# Patient Record
Sex: MALE | Race: WHITE | NOT HISPANIC OR LATINO | Employment: STUDENT | URBAN - METROPOLITAN AREA
[De-identification: names, ages, dates, MRNs, and addresses within clinical notes are randomized per-mention and may not be internally consistent; named-entity substitution may affect disease eponyms.]

---
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
## 2019-06-01 ENCOUNTER — OFFICE VISIT (OUTPATIENT)
Dept: URGENT CARE | Facility: CLINIC | Age: 19
End: 2019-06-01
Payer: COMMERCIAL

## 2019-06-01 VITALS
OXYGEN SATURATION: 99 % | DIASTOLIC BLOOD PRESSURE: 65 MMHG | WEIGHT: 195 LBS | TEMPERATURE: 98 F | RESPIRATION RATE: 18 BRPM | HEART RATE: 75 BPM | SYSTOLIC BLOOD PRESSURE: 130 MMHG | HEIGHT: 72 IN | BODY MASS INDEX: 26.41 KG/M2

## 2019-06-01 DIAGNOSIS — R10.9 ABDOMINAL CRAMPING: ICD-10-CM

## 2019-06-01 DIAGNOSIS — R11.2 NON-INTRACTABLE VOMITING WITH NAUSEA, UNSPECIFIED VOMITING TYPE: Primary | ICD-10-CM

## 2019-06-01 DIAGNOSIS — R19.7 DIARRHEA, UNSPECIFIED TYPE: ICD-10-CM

## 2019-06-01 PROCEDURE — 99214 OFFICE O/P EST MOD 30 MIN: CPT | Mod: 25,S$GLB,, | Performed by: PHYSICIAN ASSISTANT

## 2019-06-01 PROCEDURE — 99214 PR OFFICE/OUTPT VISIT, EST, LEVL IV, 30-39 MIN: ICD-10-PCS | Mod: 25,S$GLB,, | Performed by: PHYSICIAN ASSISTANT

## 2019-06-01 PROCEDURE — 96372 PR INJECTION,THERAP/PROPH/DIAG2ST, IM OR SUBCUT: ICD-10-PCS | Mod: S$GLB,,, | Performed by: PHYSICIAN ASSISTANT

## 2019-06-01 PROCEDURE — 3008F PR BODY MASS INDEX (BMI) DOCUMENTED: ICD-10-PCS | Mod: CPTII,S$GLB,, | Performed by: PHYSICIAN ASSISTANT

## 2019-06-01 PROCEDURE — 96372 THER/PROPH/DIAG INJ SC/IM: CPT | Mod: S$GLB,,, | Performed by: PHYSICIAN ASSISTANT

## 2019-06-01 PROCEDURE — 3008F BODY MASS INDEX DOCD: CPT | Mod: CPTII,S$GLB,, | Performed by: PHYSICIAN ASSISTANT

## 2019-06-01 RX ORDER — PROMETHAZINE HYDROCHLORIDE 25 MG/1
25 TABLET ORAL EVERY 6 HOURS PRN
Qty: 20 TABLET | Refills: 0 | Status: SHIPPED | OUTPATIENT
Start: 2019-06-01

## 2019-06-01 RX ORDER — ONDANSETRON 2 MG/ML
4 INJECTION INTRAMUSCULAR; INTRAVENOUS
Status: COMPLETED | OUTPATIENT
Start: 2019-06-01 | End: 2019-06-01

## 2019-06-01 RX ORDER — DICYCLOMINE HYDROCHLORIDE 10 MG/1
10 CAPSULE ORAL 4 TIMES DAILY
Qty: 16 CAPSULE | Refills: 0 | Status: SHIPPED | OUTPATIENT
Start: 2019-06-01 | End: 2019-06-05

## 2019-06-01 RX ADMIN — ONDANSETRON 4 MG: 2 INJECTION INTRAMUSCULAR; INTRAVENOUS at 05:06

## 2019-06-01 NOTE — PATIENT INSTRUCTIONS
Nonspecific Vomiting and Diarrhea (Adult)  Vomiting and diarrhea can have many causes, including:  · Helping your body get rid of harmful substances   · Gastroenteritis caused by viruses, parasites, bacteria, or toxins.  · Allergy to or side effect of a food or medicine  · Severe stress or worry (anxiety)   · Other illnesses  · Pregnancy  It is often hard to pinpoint an exact cause, even with testing. Vomiting and diarrhea often go away within a day or two without problems. If they continue, though, they can lead to too much loss of fluid (dehydration). This can be serious if not treated.    Home care  Medications  · You may use acetaminophen or NSAID medicines like ibuprofen or naproxen to control fever, unless another medicine was prescribed. If you have chronic liver or kidney disease, talk with your healthcare provider before using these medicines. Also talk with your provider if you've had a stomach ulcer or gastrointestinal bleeding. Don't give aspirin to anyone under 18 years of age who is ill with a fever. Don't use NSAID medicines if you are already taking one for another condition (like arthritis) or are on aspirin (such as for heart disease or after a stroke)  · If medicines for diarrhea or vomiting were prescribed, take these only as directed. Never take these without a healthcare providers approval.  General care  · If symptoms are severe, rest at home for the next 24 hours, or until you are feeling better.  · Washing your hands with soap and water, or using alcohol-based hand  is the best way to stop the spread of infection. Wash your hands after touching anyone who is sick.  · Wash your hands after using the toilet and before meals. Clean the toilet after each use.  · Caffeine, tobacco, and alcohol can make the diarrhea, cramping, and pain worse. Remember, caffeine not only is in coffee, but also is in chocolate, some energy drinks, and teas.  Diet  · Water and clear liquids are important  so you don't get dehydrated. Drink a small amount at a time. Don't guzzle down the drinks. That may increase your nausea, make cramping worse, and cause the drinks to come back up.  · Sports drinks may also help. Make sure they are not too sugary, because this can sometimes make things worse. Also, don't drink beverages that are too acidic, like orange juice and grape juice.  · If you are very dehydrated, sports drinks aren't a good choice. They have too much sugar and not enough electrolytes. In this case, commercially available products called oral rehydration solutions are best.  Food  · Don't force yourself to eat, especially if you have cramps, diarrhea, or vomiting. Eat just a little at a time, and then wait a few minutes before you try to eat more.  · Don't eat fatty, greasy, spicy, or fried foods.  · Don't eat dairy products if you have diarrhea. They can make it worse.  During the first 24 hours (the first full day), follow the diet below:  · Beverages: Sports drinks, soft drinks without caffeine, mineral water, and decaffeinated tea and coffee  · Soups: Clear broth, consommé, and bouillon  · Desserts: Plain gelatin, popsicles, and fruit juice bars  During the next 24 hours (the second day), you may add the following to the above if you are better. If not, continue what you did the first day:  · Hot cereal, plain toast, bread, rolls, crackers  · Plain noodles, rice, mashed potatoes, chicken noodle or rice soup  · Unsweetened canned fruit (avoid pineapple), bananas  · Limit fat intake to less than 15 grams per day by avoiding margarine, butter, oils, mayonnaise, sauces, gravies, fried foods, peanut butter, meat, poultry, and fish.  · Limit fiber. Avoid raw or cooked vegetables, fresh fruits (except bananas) and bran cereals.  · Limit caffeine and chocolate. No spices or seasonings except salt.  During the next 24 hours:  · Gradually resume a normal diet, as you feel better and your symptoms improve.  · If at  any time your symptoms start getting worse again, go back to clear liquids until you feel better.  Food preparation  · If you have diarrhea, you should not prepare food for others. When preparing foods, wash your hands before and after.  · Wash your hands or use alcohol-based  after using cutting boards, countertops, and knives that have been in contact with raw food.  · Keep uncooked meats away from cooked and ready-to-eat foods.  Follow-up care  Follow up with your healthcare advisor, or as advised. Call if you do not get better in the next 2 to 3 days. If a stool (diarrhea) sample was taken, or cultures done, you will be told if they are positive, or if your treatment needs to be changed. You may call as directed for the results.  If X-rays were taken, and a radiologist has not yet looked at them, he or she will do so. You will be told if there is a change in the reading, especially if it affects your treatment.  Call 911  Call 911 if any of these occur:  · Trouble breathing  · Chest pain  · Confusion  · Severe drowsiness or trouble awakening  · Fainting or loss of consciousness  · Rapid heart rate  · Seizure  · Stiff neck  · Severe weakness, dizziness, or lightheadedness  When to seek medical advice  Call your healthcare provider right away if any of these occur:  · Bloody or black vomit or stools.  · Severe, steady abdominal pain or any abdominal pain that is getting worse.  · Severe headache or stiff neck  · An inability to hold down even sips of liquids for more than 12 hours.  · Vomiting that lasts more than 24 hours.  · Diarrhea that lasts more than 24 hours.  · Fever of 100.4°F (38.0°C) or higher that lasts more than 48 hours, or as directed by your health care provider  · Yellowish color to your skin or the whites of your eyes.  · Signs of dehydration, such as dry mouth, little urine (less than every 6 hours), or very dark urine.  Date Last Reviewed: 1/3/2016  © 1250-6267 [employer] [address] [place] [id number]. All rights reserved. This information is not intended as a substitute for professional medical care. Always follow your healthcare professional's instructions.      Please follow up with your Primary care provider within 2-5 days if your signs and symptoms have not resolved or worsen.     If your condition worsens or fails to improve we recommend that you receive another evaluation at the emergency room immediately or contact your primary medical clinic to discuss your concerns.   You must understand that you have received an Urgent Care treatment only and that you may be released before all of your medical problems are known or treated. You, the patient, will arrange for follow up care as instructed.     RED FLAGS/WARNING SYMPTOMS DISCUSSED WITH PATIENT THAT WOULD WARRANT EMERGENT MEDICAL ATTENTION. PATIENT VERBALIZED UNDERSTANDING.

## 2019-06-01 NOTE — PROGRESS NOTES
Patient ID: Erick Doyle is a 18 y.o. male.    Vitals:  height is 6' (1.829 m) and weight is 88.5 kg (195 lb). His oral temperature is 98.4 °F (36.9 °C). His blood pressure is 130/65 and his pulse is 75. His respiration is 18 and oxygen saturation is 99%.     Chief Complaint: Abdominal Pain (vomiting)    Patient presents with abdominal pain, nausea,  Vomiting,  post food consumption.       Abdominal Pain   This is a new problem. The current episode started today. The problem occurs rarely. The most recent episode lasted 10 hours. The problem has been gradually worsening. The pain is located in the generalized abdominal region. The pain is at a severity of 6/10. The pain is mild. The quality of the pain is aching, dull and cramping. Associated symptoms include diarrhea, nausea and vomiting. Pertinent negatives include no constipation, dysuria or fever. He has tried antacids for the symptoms. The treatment provided mild relief. There is no history of abdominal surgery.       Constitution: Positive for activity change, appetite change and chills. Negative for sweating and fever.   Cardiovascular: Negative for chest pain.   Respiratory: Negative for shortness of breath.    Gastrointestinal: Positive for abdominal pain, nausea, vomiting and diarrhea. Negative for abdominal trauma, abdominal bloating, history of abdominal surgery, constipation, dark colored stools and heartburn.   Genitourinary: Negative for dysuria and pelvic pain.   Musculoskeletal: Negative for back pain.       Objective:      Physical Exam   Constitutional: He is oriented to person, place, and time. He appears well-developed and well-nourished.   HENT:   Head: Normocephalic and atraumatic.   Right Ear: External ear normal.   Left Ear: External ear normal.   Nose: Nose normal.   Mouth/Throat: Mucous membranes are normal.   Eyes: Conjunctivae and lids are normal.   Neck: Trachea normal and full passive range of motion without pain. Neck supple.    Cardiovascular: Normal rate, regular rhythm and normal heart sounds.   Pulmonary/Chest: Effort normal and breath sounds normal. No respiratory distress.   Abdominal: Soft. Normal appearance and bowel sounds are normal. He exhibits no distension, no abdominal bruit, no pulsatile midline mass and no mass. There is no hepatosplenomegaly. There is no tenderness. There is no rigidity, no rebound, no guarding, no CVA tenderness, no tenderness at McBurney's point and negative Sparks's sign. No hernia.   Musculoskeletal: Normal range of motion. He exhibits no edema.   Neurological: He is alert and oriented to person, place, and time. He has normal strength.   Skin: Skin is warm, dry and intact. He is not diaphoretic. No pallor.   Psychiatric: He has a normal mood and affect. His speech is normal and behavior is normal. Judgment and thought content normal. Cognition and memory are normal.   Nursing note and vitals reviewed.      Assessment:       1. Non-intractable vomiting with nausea, unspecified vomiting type    2. Diarrhea, unspecified type    3. Abdominal cramping        Plan:         Non-intractable vomiting with nausea, unspecified vomiting type  -     ondansetron injection 4 mg  -     promethazine (PHENERGAN) 25 MG tablet; Take 1 tablet (25 mg total) by mouth every 6 (six) hours as needed for Nausea.  Dispense: 20 tablet; Refill: 0    Diarrhea, unspecified type    Abdominal cramping  -     dicyclomine (BENTYL) 10 MG capsule; Take 1 capsule (10 mg total) by mouth 4 (four) times daily. for 4 days  Dispense: 16 capsule; Refill: 0          Nonspecific Vomiting and Diarrhea (Adult)  Vomiting and diarrhea can have many causes, including:  · Helping your body get rid of harmful substances   · Gastroenteritis caused by viruses, parasites, bacteria, or toxins.  · Allergy to or side effect of a food or medicine  · Severe stress or worry (anxiety)   · Other illnesses  · Pregnancy  It is often hard to pinpoint an exact cause,  even with testing. Vomiting and diarrhea often go away within a day or two without problems. If they continue, though, they can lead to too much loss of fluid (dehydration). This can be serious if not treated.    Home care  Medications  · You may use acetaminophen or NSAID medicines like ibuprofen or naproxen to control fever, unless another medicine was prescribed. If you have chronic liver or kidney disease, talk with your healthcare provider before using these medicines. Also talk with your provider if you've had a stomach ulcer or gastrointestinal bleeding. Don't give aspirin to anyone under 18 years of age who is ill with a fever. Don't use NSAID medicines if you are already taking one for another condition (like arthritis) or are on aspirin (such as for heart disease or after a stroke)  · If medicines for diarrhea or vomiting were prescribed, take these only as directed. Never take these without a healthcare providers approval.  General care  · If symptoms are severe, rest at home for the next 24 hours, or until you are feeling better.  · Washing your hands with soap and water, or using alcohol-based hand  is the best way to stop the spread of infection. Wash your hands after touching anyone who is sick.  · Wash your hands after using the toilet and before meals. Clean the toilet after each use.  · Caffeine, tobacco, and alcohol can make the diarrhea, cramping, and pain worse. Remember, caffeine not only is in coffee, but also is in chocolate, some energy drinks, and teas.  Diet  · Water and clear liquids are important so you don't get dehydrated. Drink a small amount at a time. Don't guzzle down the drinks. That may increase your nausea, make cramping worse, and cause the drinks to come back up.  · Sports drinks may also help. Make sure they are not too sugary, because this can sometimes make things worse. Also, don't drink beverages that are too acidic, like orange juice and grape juice.  · If you  are very dehydrated, sports drinks aren't a good choice. They have too much sugar and not enough electrolytes. In this case, commercially available products called oral rehydration solutions are best.  Food  · Don't force yourself to eat, especially if you have cramps, diarrhea, or vomiting. Eat just a little at a time, and then wait a few minutes before you try to eat more.  · Don't eat fatty, greasy, spicy, or fried foods.  · Don't eat dairy products if you have diarrhea. They can make it worse.  During the first 24 hours (the first full day), follow the diet below:  · Beverages: Sports drinks, soft drinks without caffeine, mineral water, and decaffeinated tea and coffee  · Soups: Clear broth, consommé, and bouillon  · Desserts: Plain gelatin, popsicles, and fruit juice bars  During the next 24 hours (the second day), you may add the following to the above if you are better. If not, continue what you did the first day:  · Hot cereal, plain toast, bread, rolls, crackers  · Plain noodles, rice, mashed potatoes, chicken noodle or rice soup  · Unsweetened canned fruit (avoid pineapple), bananas  · Limit fat intake to less than 15 grams per day by avoiding margarine, butter, oils, mayonnaise, sauces, gravies, fried foods, peanut butter, meat, poultry, and fish.  · Limit fiber. Avoid raw or cooked vegetables, fresh fruits (except bananas) and bran cereals.  · Limit caffeine and chocolate. No spices or seasonings except salt.  During the next 24 hours:  · Gradually resume a normal diet, as you feel better and your symptoms improve.  · If at any time your symptoms start getting worse again, go back to clear liquids until you feel better.  Food preparation  · If you have diarrhea, you should not prepare food for others. When preparing foods, wash your hands before and after.  · Wash your hands or use alcohol-based  after using cutting boards, countertops, and knives that have been in contact with raw food.  · Keep  uncooked meats away from cooked and ready-to-eat foods.  Follow-up care  Follow up with your healthcare advisor, or as advised. Call if you do not get better in the next 2 to 3 days. If a stool (diarrhea) sample was taken, or cultures done, you will be told if they are positive, or if your treatment needs to be changed. You may call as directed for the results.  If X-rays were taken, and a radiologist has not yet looked at them, he or she will do so. You will be told if there is a change in the reading, especially if it affects your treatment.  Call 911  Call 911 if any of these occur:  · Trouble breathing  · Chest pain  · Confusion  · Severe drowsiness or trouble awakening  · Fainting or loss of consciousness  · Rapid heart rate  · Seizure  · Stiff neck  · Severe weakness, dizziness, or lightheadedness  When to seek medical advice  Call your healthcare provider right away if any of these occur:  · Bloody or black vomit or stools.  · Severe, steady abdominal pain or any abdominal pain that is getting worse.  · Severe headache or stiff neck  · An inability to hold down even sips of liquids for more than 12 hours.  · Vomiting that lasts more than 24 hours.  · Diarrhea that lasts more than 24 hours.  · Fever of 100.4°F (38.0°C) or higher that lasts more than 48 hours, or as directed by your health care provider  · Yellowish color to your skin or the whites of your eyes.  · Signs of dehydration, such as dry mouth, little urine (less than every 6 hours), or very dark urine.  Date Last Reviewed: 1/3/2016  © 6944-1190 [employer]. [address] [place] [address], [place] [address]. All rights reserved. This information is not intended as a substitute for professional medical care. Always follow your healthcare professional's instructions.      Please follow up with your Primary care provider within 2-5 days if your signs and symptoms have not resolved or worsen.     If your condition worsens or fails to improve we  recommend that you receive another evaluation at the emergency room immediately or contact your primary medical clinic to discuss your concerns.   You must understand that you have received an Urgent Care treatment only and that you may be released before all of your medical problems are known or treated. You, the patient, will arrange for follow up care as instructed.     RED FLAGS/WARNING SYMPTOMS DISCUSSED WITH PATIENT THAT WOULD WARRANT EMERGENT MEDICAL ATTENTION. PATIENT VERBALIZED UNDERSTANDING.

## 2020-07-03 ENCOUNTER — OFFICE VISIT (OUTPATIENT)
Dept: URGENT CARE | Facility: CLINIC | Age: 20
End: 2020-07-03
Payer: COMMERCIAL

## 2020-07-03 VITALS
HEART RATE: 87 BPM | WEIGHT: 195 LBS | OXYGEN SATURATION: 96 % | BODY MASS INDEX: 26.41 KG/M2 | HEIGHT: 72 IN | TEMPERATURE: 99 F

## 2020-07-03 DIAGNOSIS — S62.346A CLOSED NONDISPLACED FRACTURE OF BASE OF FIFTH METACARPAL BONE OF RIGHT HAND, INITIAL ENCOUNTER: Primary | ICD-10-CM

## 2020-07-03 PROCEDURE — 99214 PR OFFICE/OUTPT VISIT, EST, LEVL IV, 30-39 MIN: ICD-10-PCS | Mod: S$GLB,,, | Performed by: EMERGENCY MEDICINE

## 2020-07-03 PROCEDURE — 99214 OFFICE O/P EST MOD 30 MIN: CPT | Mod: S$GLB,,, | Performed by: EMERGENCY MEDICINE

## 2020-07-03 NOTE — PATIENT INSTRUCTIONS
REFERRAL TO ORTHOPEDICS [facility].   IF YOU DO NOT HEAR FROM THEM BY Monday AFTERNOON, CALL [phone] TO SET UP APPOINTMENT  CONTINUE BOOT AND CRUTCHES  WORK NOTE GIVEN  RETURN FOR ANY CONCERNS OR PROBLEMS      Understanding Fifth Metatarsal Fracture    A fifth metatarsal fracture is a type of broken bone in your foot. You have 5 metatarsals. They are the middle bones in your feet, between your toes and your anklebones (tarsals). The fifth metatarsal connects your smallest toe to your ankle. These bones help with arch support and balance.     How to say it  met--TAAZAEL-sal   What causes a fifth metatarsal fracture?  A direct blow to the bone is often the cause of a fracture of the fifth metatarsal. That may happen if you drop a heavy object on your foot or land wrong on your foot or ankle. Twisting activities can also break the bone. Pivoting while playing basketball is one example.  Repeatedly placing too much stress on the bone can also cause a fracture of the fifth metatarsal. This is called a stress fracture. People who do physical activities like dancing or running tend to be more prone to stress fractures.  Symptoms of a fifth metatarsal fracture  Sudden pain along the outside of your foot is the main symptom. A stress fracture may develop more slowly. You may feel chronic pain for a period of time. Your foot may also swell up and bruise. You may have trouble walking.  Treatment for a fifth metatarsal fracture  Treatment for this type of fracture depends on where the bone is broken and how severe the breakage is. Healing can take up to several months. Treatment may include:  · Cold therapy. Putting ice on the area may reduce swelling and pain, especially in the first few days after injury.  · Elevation. Propping up the foot so it is above the level of your heart may ease swelling.  · Prescription or over-the-counter pain medicines. These help reduce pain and swelling.  · Immobilization. Devices such as a splint,  cast, or walking boot can protect the bone and ease pain. They can help keep the bone in place so it heals properly. You may need to avoid putting any weight on the broken bone for a period of time. Severe fractures usually need a longer limit on weight-bearing activities.  · Stretching and strengthening exercises. Certain exercises can help you regain flexibility and strength in your foot.  · Surgery. You usually will not need surgery. But you may need it if the bone is broken into 2 or more pieces and is not aligned (displaced), doesnt heal properly, or takes a long time to heal.  Possible complications of a fifth metatarsal fracture  · The bone doesnt heal correctly  · Acute compartment syndrome. This is when pressure builds up in the muscles of the foot and affects blood flow.     When to call your healthcare provider  Call your healthcare provider right away if you have any of these:  · Fever of 100.4°F (38°C) or higher, or as directed  · Symptoms that dont get better, or get worse  · Numbness or coldness in your foot  · Toe nails that turn blue or grey in color  · New symptoms   Date Last Reviewed: 3/10/2016  © 4232-0922 [employer]. [address] [place] [address], [place] [address]. All rights reserved. This information is not intended as a substitute for professional medical care. Always follow your healthcare professional's instructions.

## 2020-07-03 NOTE — PROGRESS NOTES
Subjective:       Patient ID: Erick Doyle is a 19 y.o. male.    Vitals:  height is 6' (1.829 m) and weight is 88.5 kg (195 lb). His temperature is 99.1 °F (37.3 °C). His pulse is 87. His oxygen saturation is 96%.     Chief Complaint: Foot Injury    Pt broke his foot a week ago at [employer]. He has been trying to get in with ortho but hasnt gotten a response.    HAS WALKING BOOT, CRUTCHES, MEDS, REQUESTING REFERRAL TO ORTHOPEDICS TO GET CLEARED FOR WORK OR RECS FOR WORK. HE JUST GOT HIRED AS  AT [employer] AND HAS NOT BEEN ABLE TO WORK. 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   HENT: Negative for congestion and sore throat.    Neck: Negative for painful lymph nodes.   Cardiovascular: Negative for chest pain and leg swelling.   Eyes: Negative for double vision and blurred vision.   Respiratory: Negative for cough and shortness of breath.    Gastrointestinal: Negative for nausea, vomiting and diarrhea.   Genitourinary: Negative for dysuria, frequency and urgency.   Musculoskeletal: Negative for joint pain, joint swelling, muscle cramps and muscle ache.   Skin: Negative for color change, pale, rash and erythema.   Allergic/Immunologic: Negative for seasonal allergies.   Neurological: Negative for dizziness, history of vertigo, light-headedness, passing out and headaches.   Hematologic/Lymphatic: Negative for swollen lymph nodes, easy bruising/bleeding and history of blood clots. Does not bruise/bleed easily.   Psychiatric/Behavioral: Negative for nervous/anxious, sleep disturbance and depression. The patient is not nervous/anxious.        Objective:      Physical Exam   Constitutional: He is oriented to person, place, and time. He appears well-developed.   HENT:   Head: Normocephalic and atraumatic. Head is without abrasion, without contusion and without laceration.   Right Ear: External ear normal.   Left Ear: External ear normal.    Nose: Nose normal.   Mouth/Throat: Oropharynx is clear and moist and mucous membranes are normal.   Eyes: Pupils are equal, round, and reactive to light. Conjunctivae, EOM and lids are normal.   Neck: Trachea normal, full passive range of motion without pain and phonation normal. Neck supple.   Cardiovascular: Normal rate, regular rhythm and normal heart sounds.   Pulmonary/Chest: Effort normal and breath sounds normal. No stridor. No respiratory distress.   Musculoskeletal: Normal range of motion.         General: Tenderness and signs of injury present.      Comments: RIGHT FOOT TTP, IN BOOT, FRACTURED BASE OF 5TH METATARSAL. DISTALLY NV INTACT   Neurological: He is alert and oriented to person, place, and time.   Skin: Skin is warm, dry, intact and no rash. Capillary refill takes less than 2 seconds. abrasion, burn, bruising, erythema and ecchymosis  Psychiatric: His speech is normal and behavior is normal. Judgment and thought content normal.   Nursing note and vitals reviewed.      I HAVE REVIEWED THE PAPERWORK FROM [facility] SHOWING BASE OF 5TH MT FRACTURE. IN BOOT , HAS CRUTCHES, HAS MEDS.  Assessment:       1. Closed nondisplaced fracture of base of fifth metacarpal bone of right hand, initial encounter        Plan:         Closed nondisplaced fracture of base of fifth metacarpal bone of right hand, initial encounter  -     Ambulatory referral/consult to Orthopedics         Patient Instructions     REFERRAL TO [facility]OP[facility].   IF YOU DO NOT HEAR FROM THEM BY Monday AFTERNOON, CALL [phone] TO SET UP APPOINTMENT  CONTINUE BOOT AND CRUTCHES  WORK NOTE GIVEN  RETURN FOR ANY CONCERNS OR PROBLEMS      Understanding Fifth Metatarsal Fracture    A fifth metatarsal fracture is a type of broken bone in your foot. You have 5 metatarsals. They are the middle bones in your feet, between your toes and your anklebones (tarsals). The fifth metatarsal connects your smallest toe to your ankle. These bones help with arch  support and balance.     How to say it  met-ah-TAHR-sal   What causes a fifth metatarsal fracture?  A direct blow to the bone is often the cause of a fracture of the fifth metatarsal. That may happen if you drop a heavy object on your foot or land wrong on your foot or ankle. Twisting activities can also break the bone. Pivoting while playing basketball is one example.  Repeatedly placing too much stress on the bone can also cause a fracture of the fifth metatarsal. This is called a stress fracture. People who do physical activities like dancing or running tend to be more prone to stress fractures.  Symptoms of a fifth metatarsal fracture  Sudden pain along the outside of your foot is the main symptom. A stress fracture may develop more slowly. You may feel chronic pain for a period of time. Your foot may also swell up and bruise. You may have trouble walking.  Treatment for a fifth metatarsal fracture  Treatment for this type of fracture depends on where the bone is broken and how severe the breakage is. Healing can take up to several months. Treatment may include:  · Cold therapy. Putting ice on the area may reduce swelling and pain, especially in the first few days after injury.  · Elevation. Propping up the foot so it is above the level of your heart may ease swelling.  · Prescription or over-the-counter pain medicines. These help reduce pain and swelling.  · Immobilization. Devices such as a splint, cast, or walking boot can protect the bone and ease pain. They can help keep the bone in place so it heals properly. You may need to avoid putting any weight on the broken bone for a period of time. Severe fractures usually need a longer limit on weight-bearing activities.  · Stretching and strengthening exercises. Certain exercises can help you regain flexibility and strength in your foot.  · Surgery. You usually will not need surgery. But you may need it if the bone is broken into 2 or more pieces and is not  aligned (displaced), doesnt heal properly, or takes a long time to heal.  Possible complications of a fifth metatarsal fracture  · The bone doesnt heal correctly  · Acute compartment syndrome. This is when pressure builds up in the muscles of the foot and affects blood flow.     When to call your healthcare provider  Call your healthcare provider right away if you have any of these:  · Fever of 100.4°F (38°C) or higher, or as directed  · Symptoms that dont get better, or get worse  · Numbness or coldness in your foot  · Toe nails that turn blue or grey in color  · New symptoms   Date Last Reviewed: 3/10/2016  © 9337-0611 [employer]. [address] [place] [address], [place] [address]. All rights reserved. This information is not intended as a substitute for professional medical care. Always follow your healthcare professional's instructions.

## 2020-07-03 NOTE — LETTER
July 3, 2020      [facility] [address] [place] [address]  Phone: [phone]  Fax: [phone]       Patient: Erick Doyle   YOB: 2000  Date of Visit: 07/03/2020    To Whom It May Concern:    Antonio Doyle  was at [facility] on 07/03/2020. He may return to work/school on 7/5/20 with restrictions. If you have any questions or concerns, or if I can be of further assistance, please do not hesitate to contact me.    PATIENT NOT ABLE TO RETURN IN FULL CAPACITY AT THIS TIME. HE HAS BEEN REFERRED TO ORTHOPEDICS AND APPOINTMENT PENDING. IF THERE IS DESK OR LIGHT DUTY WORK AVAILABLE, HE CAN DO THIS, BUT SHOULD NOT BE ON FEET UNTIL CLEARED BY ORTHOPEDICS.    Sincerely,      Talha Slaughter MD

## 2020-07-07 ENCOUNTER — TELEPHONE (OUTPATIENT)
Dept: ORTHOPEDICS | Facility: CLINIC | Age: 20
End: 2020-07-07

## 2020-07-07 ENCOUNTER — OFFICE VISIT (OUTPATIENT)
Dept: ORTHOPEDICS | Facility: CLINIC | Age: 20
End: 2020-07-07
Payer: COMMERCIAL

## 2020-07-07 ENCOUNTER — HOSPITAL ENCOUNTER (OUTPATIENT)
Dept: RADIOLOGY | Facility: HOSPITAL | Age: 20
Discharge: HOME | End: 2020-07-07
Attending: NURSE PRACTITIONER
Payer: COMMERCIAL

## 2020-07-07 VITALS — BODY MASS INDEX: 26.41 KG/M2 | WEIGHT: 195 LBS | HEIGHT: 72 IN

## 2020-07-07 DIAGNOSIS — S92.354A CLOSED NONDISPLACED FRACTURE OF FIFTH METATARSAL BONE OF RIGHT FOOT, INITIAL ENCOUNTER: Primary | ICD-10-CM

## 2020-07-07 DIAGNOSIS — M79.671 RIGHT FOOT PAIN: ICD-10-CM

## 2020-07-07 PROCEDURE — 3008F PR BODY MASS INDEX (BMI) DOCUMENTED: ICD-10-PCS | Mod: CPTII,S$GLB,, | Performed by: NURSE PRACTITIONER

## 2020-07-07 PROCEDURE — 3008F BODY MASS INDEX DOCD: CPT | Mod: CPTII,S$GLB,, | Performed by: NURSE PRACTITIONER

## 2020-07-07 PROCEDURE — 99999 PR PBB SHADOW E&M-EST. PATIENT-LVL III: ICD-10-PCS | Mod: PBBFAC,,, | Performed by: NURSE PRACTITIONER

## 2020-07-07 PROCEDURE — 73630 X-RAY EXAM OF FOOT: CPT | Mod: TC,RT

## 2020-07-07 PROCEDURE — 73630 XR FOOT COMPLETE 3 VIEW RIGHT: ICD-10-PCS | Mod: 26,RT,, | Performed by: RADIOLOGY

## 2020-07-07 PROCEDURE — 73630 X-RAY EXAM OF FOOT: CPT | Mod: 26,RT,, | Performed by: RADIOLOGY

## 2020-07-07 PROCEDURE — 99203 PR OFFICE/OUTPT VISIT, NEW, LEVL III, 30-44 MIN: ICD-10-PCS | Mod: S$GLB,,, | Performed by: NURSE PRACTITIONER

## 2020-07-07 PROCEDURE — 99203 OFFICE O/P NEW LOW 30 MIN: CPT | Mod: S$GLB,,, | Performed by: NURSE PRACTITIONER

## 2020-07-07 PROCEDURE — 99999 PR PBB SHADOW E&M-EST. PATIENT-LVL III: CPT | Mod: PBBFAC,,, | Performed by: NURSE PRACTITIONER

## 2020-07-07 NOTE — TELEPHONE ENCOUNTER
----- Message from Hyun Del Valle LPN sent at 7/6/2020  5:30 PM CDT -----  Regarding: FW: NEW PATIENT SCHEDULING  Contact: PT  Clarification:    Hand fracture. Referral in [facility].   Thank you,Hyun  ----- Message -----  From: Kenya Christianson MA  Sent: 7/6/2020   5:03 PM CDT  To: Hyun Del Valle LPN  Subject: FW: NEW PATIENT SCHEDULING                       Please schedule with foot / ankle group  Ekaterina Mojica  ----- Message -----  From: Karlene Rodriguez  Sent: 7/6/2020   4:54 PM CDT  To: [facility] Clinical Support  Subject: NEW PATIENT SCHEDULING                           Patient fractured his closed non displayed fracture of the 5th metatarsal on the right foot. [place] put the referral in. Needs an appointment scheduled, most providers had send an inbasket message to [place]. Please reach out.

## 2020-07-07 NOTE — LETTER
July 7, 2020      [facility]  [address]  [facility] [address]  Phone: [phone]       Patient: Erick Doyle   YOB: 2000  Date of Visit: 07/07/2020    To Whom It May Concern:    Antonio Doyle  was at [facility] on 07/07/2020. He may return to work/school on 7/8/2020 with restrictions. Please allow to work no more than 8 hours and allow time to sit to elevate his right foot and ice if needed.  He will be seen again in 4 weeks where these restrictions will be re-evaluated.  If you have any questions or concerns, or if I can be of further assistance, please do not hesitate to contact me.    Sincerely,        Michael Rojas NP

## 2020-07-07 NOTE — TELEPHONE ENCOUNTER
Tried calling patient, left message to call back. In the meantime, Hyun told me patient was already scheduled with Michael. Spoke to  and he said that was fine.

## 2020-07-07 NOTE — PROGRESS NOTES
SUBJECTIVE:     Chief Complaint & History of Present Illness:  Erick Doyle is a New 19 y.o. year old male patient here for intermittent right foot/ankle pain which has been present for 2 weeks ago.  There is a history of injury.  He reports he slipped off of his porch injuring his right foot.  He was initially seen at [facility] and told to follow up with orthopedics but was unable to get an appointment with [facility] so decided to come to [facility].  He reports he has pain to the top part of his foot.  He is using Motrin PRN for pain control.  He has been wearing his cam boot and walking with crutches.  He denies any further injuries and denies numbness or tingling sensation to toes.      Review of patient's allergies indicates:  No Known Allergies      Current Outpatient Medications   Medication Sig Dispense Refill    promethazine (PHENERGAN) 25 MG tablet Take 1 tablet (25 mg total) by mouth every 6 (six) hours as needed for Nausea. (Patient not taking: Reported on 7/3/2020) 20 tablet 0     No current facility-administered medications for this visit.        History reviewed. No pertinent past medical history.    History reviewed. No pertinent surgical history.    Family History   Problem Relation Age of Onset    No Known Problems Mother     No Known Problems Father          Review of Systems:  ROS:  Constitutional: no fever or chills  Eyes: no visual changes  ENT: no nasal congestion or sore throat  Respiratory: no cough or shortness of breath  Cardiovascular: no chest pain or palpitations  Gastrointestinal: no nausea or vomiting, tolerating diet  Genitourinary: no hematuria or dysuria  Integument/Breast: no rash or pruritis  Hematologic/Lymphatic: no easy bruising or lymphadenopathy  Musculoskeletal: toe fracture  Neurological: no seizures or tremors  Behavioral/Psych: no auditory or visual hallucinations  Endocrine: no heat or cold intolerance      OBJECTIVE:     PHYSICAL EXAM:  Vital Signs (Most Recent)  There  were no vitals filed for this visit.  Height: 6' (182.9 cm) Weight: 88.5 kg (195 lb),   General Appearance: Well nourished, well developed, in no acute distress.  HENT: Normal cephalic, oropharynx pink and moist  Eyes: PERRLA bilaterally and EOM x 4  Respiratory: Even and unlabored  Skin: Warm and Dry. Ecchymosis to dorsum of foot.  Psychiatric: AAO x 4, Mood & affect are normal.    right  Foot/Ankle    General appearance: no acute distress, alert/oriented x3, appropriate mood and affect, looks stated age, slender and well nourished  The examination was performed out of splint/cast  Skin: healing bruising  Swelling: minimal  Warmth: no warmth  Tenderness: diffuse  ROM: 10 degrees dorsiflexion, 10 degrees plantarflexion, 10 degrees inversion and 10 degrees eversion  Strength: normal  Gait: antalgic  Stability: stable to testing and Cotton test: negative  Crepitus: no  Neurological Exam: normal  Vascular Exam: normal and pulse present    soft tissue swelling noted over the dorsum of foot and mild tenderness      RADIOGRAPHS:  X-ray of right foot obtained and personally reviewed by me, he has a distal 5th metatarsal fracture that appears stable.    ASSESSMENT/PLAN:       ICD-10-CM ICD-9-CM   1. Closed nondisplaced fracture of fifth metatarsal bone of right foot, initial encounter  S92.354A 825.25       Plan:  -Erick Doyle presents to clinic today with c/c right foot/ankle pain for the past 2 weeks  -X-ray as above.  -Recommend RICE therapy.  -He can try to transition to regular shoe.  -Can return to work with limitations, note given.  -I will see him back in 4 weeks with repeat right foot x-ray.  -May alternate Tylenol/NSAIDs PRN for pain control.  -Call for questions or concerns.

## 2020-07-07 NOTE — TELEPHONE ENCOUNTER
Attempted to contact pt. Left voicemail. Asked pt to return call to clinic at [phone] to schedule appt for hand fracture.

## 2020-11-28 ENCOUNTER — OFFICE VISIT (OUTPATIENT)
Dept: URGENT CARE | Facility: CLINIC | Age: 20
End: 2020-11-28

## 2020-11-28 VITALS
WEIGHT: 195 LBS | HEIGHT: 72 IN | DIASTOLIC BLOOD PRESSURE: 67 MMHG | TEMPERATURE: 98 F | OXYGEN SATURATION: 96 % | HEART RATE: 72 BPM | RESPIRATION RATE: 18 BRPM | BODY MASS INDEX: 26.41 KG/M2 | SYSTOLIC BLOOD PRESSURE: 128 MMHG

## 2020-11-28 DIAGNOSIS — Z20.822 EXPOSURE TO COVID-19 VIRUS: Primary | ICD-10-CM

## 2020-11-28 LAB
CTP QC/QA: YES
SARS-COV-2 RDRP RESP QL NAA+PROBE: NEGATIVE

## 2020-11-28 PROCEDURE — U0002 COVID-19 LAB TEST NON-CDC: HCPCS | Mod: QW,S$GLB,, | Performed by: FAMILY MEDICINE

## 2020-11-28 PROCEDURE — 99213 PR OFFICE/OUTPT VISIT, EST, LEVL III, 20-29 MIN: ICD-10-PCS | Mod: S$GLB,,, | Performed by: FAMILY MEDICINE

## 2020-11-28 PROCEDURE — U0002: ICD-10-PCS | Mod: QW,S$GLB,, | Performed by: FAMILY MEDICINE

## 2020-11-28 PROCEDURE — 99213 OFFICE O/P EST LOW 20 MIN: CPT | Mod: S$GLB,,, | Performed by: FAMILY MEDICINE

## 2020-11-28 NOTE — PROGRESS NOTES
Subjective:       Patient ID: Erick Doyle is a 20 y.o. male.    Vitals:  height is 6' (1.829 m) and weight is 88.5 kg (195 lb). His temperature is 97.6 °F (36.4 °C). His blood pressure is 128/67 and his pulse is 72. His respiration is 18 and oxygen saturation is 96%.     Chief Complaint: COVID-19 Concerns    Symptoms began yesterday. Exposed on Thanksgiving.    Fatigue  This is a new problem. The current episode started yesterday. The problem occurs constantly. The problem has been unchanged. Associated symptoms include congestion and headaches. Pertinent negatives include no arthralgias, chest pain, chills, coughing, fatigue, fever, joint swelling, myalgias, nausea, rash, sore throat, vertigo or vomiting. Nothing aggravates the symptoms. He has tried nothing for the symptoms.       Constitution: Negative for chills, fatigue and fever.   HENT: Positive for congestion. Negative for sore throat.    Neck: Negative for painful lymph nodes.   Cardiovascular: Negative for chest pain and leg swelling.   Eyes: Negative for double vision and blurred vision.   Respiratory: Negative for cough and shortness of breath.    Gastrointestinal: Negative for nausea, vomiting and diarrhea.   Genitourinary: Negative for dysuria, frequency and urgency.   Musculoskeletal: Negative for joint pain, joint swelling, muscle cramps and muscle ache.   Skin: Negative for color change, pale and rash.   Allergic/Immunologic: Negative for seasonal allergies.   Neurological: Positive for headaches. Negative for dizziness, history of vertigo, light-headedness and passing out.   Hematologic/Lymphatic: Negative for swollen lymph nodes, easy bruising/bleeding and history of blood clots. Does not bruise/bleed easily.   Psychiatric/Behavioral: Negative for nervous/anxious, sleep disturbance and depression. The patient is not nervous/anxious.        Objective:      Physical Exam   Constitutional: He does not appear ill. No distress.  normal  Pulmonary/Chest: Effort normal. No respiratory distress.   Abdominal: Normal appearance.   Neurological: He is alert.     Results for orders placed or performed in visit on 11/28/20   POCT COVID-19 Rapid Screening   Result Value Ref Range    POC Rapid COVID Negative Negative     Acceptable Yes          Assessment:       1. Exposure to COVID-19 virus        Plan:         Exposure to COVID-19 virus  -     POCT COVID-19 Rapid Screening    discussed need to be quarantined for 2 weeks and monitoring of symptoms.

## 2020-12-02 ENCOUNTER — OFFICE VISIT (OUTPATIENT)
Dept: URGENT CARE | Facility: CLINIC | Age: 20
End: 2020-12-02

## 2020-12-02 VITALS
SYSTOLIC BLOOD PRESSURE: 137 MMHG | HEIGHT: 72 IN | OXYGEN SATURATION: 97 % | HEART RATE: 81 BPM | RESPIRATION RATE: 20 BRPM | DIASTOLIC BLOOD PRESSURE: 69 MMHG | BODY MASS INDEX: 26.41 KG/M2 | WEIGHT: 195 LBS | TEMPERATURE: 99 F

## 2020-12-02 DIAGNOSIS — Z20.822 COVID-19 RULED OUT: Primary | ICD-10-CM

## 2020-12-02 LAB
CTP QC/QA: YES
SARS-COV-2 RDRP RESP QL NAA+PROBE: NEGATIVE

## 2020-12-02 PROCEDURE — U0002 COVID-19 LAB TEST NON-CDC: HCPCS | Mod: QW,S$GLB,, | Performed by: FAMILY MEDICINE

## 2020-12-02 PROCEDURE — 99212 PR OFFICE/OUTPT VISIT, EST, LEVL II, 10-19 MIN: ICD-10-PCS | Mod: S$GLB,,, | Performed by: FAMILY MEDICINE

## 2020-12-02 PROCEDURE — 99212 OFFICE O/P EST SF 10 MIN: CPT | Mod: S$GLB,,, | Performed by: FAMILY MEDICINE

## 2020-12-02 PROCEDURE — U0002: ICD-10-PCS | Mod: QW,S$GLB,, | Performed by: FAMILY MEDICINE

## 2020-12-02 NOTE — PROGRESS NOTES
Subjective:       Patient ID: Erick Doyle is a 20 y.o. male.    Vitals:  height is 6' (1.829 m) and weight is 88.5 kg (195 lb). His temperature is 98.5 °F (36.9 °C). His blood pressure is 137/69 and his pulse is 81. His respiration is 20 and oxygen saturation is 97%.     Chief Complaint: COVID-19 Concerns    Patient presents with exposure to positive covid case, cough and congestion that started today.     Sinus Problem  This is a new problem. The current episode started today. The problem is unchanged. There has been no fever. He is experiencing no pain. Associated symptoms include coughing and sinus pressure. Pertinent negatives include no chills, congestion, diaphoresis, ear pain, shortness of breath or sore throat. Past treatments include nothing. The treatment provided no relief.       Constitution: Negative for chills, sweating, fatigue and fever.   HENT: Positive for sinus pressure. Negative for ear pain, congestion, sinus pain, sore throat and voice change.    Neck: Negative for painful lymph nodes.   Eyes: Negative for eye redness.   Respiratory: Positive for cough. Negative for chest tightness, sputum production, bloody sputum, COPD, shortness of breath, stridor, wheezing and asthma.    Gastrointestinal: Negative for nausea and vomiting.   Musculoskeletal: Negative for muscle ache.   Skin: Negative for rash.   Allergic/Immunologic: Negative for seasonal allergies and asthma.   Hematologic/Lymphatic: Negative for swollen lymph nodes.       Objective:      Physical Exam   Pulmonary/Chest: Effort normal. No respiratory distress.   Abdominal: Normal appearance.   Neurological: He is alert.   Nursing note and vitals reviewed.    Results for orders placed or performed in visit on 12/02/20   POCT COVID-19 Rapid Screening   Result Value Ref Range    POC Rapid COVID Negative Negative     Acceptable Yes          Assessment:       1. COVID-19 ruled out        Plan:         COVID-19 ruled out  -      POCT COVID-19 Rapid Screening

## 2024-05-09 ENCOUNTER — OFFICE VISIT (OUTPATIENT)
Dept: URGENT CARE | Facility: CLINIC | Age: 24
End: 2024-05-09
Payer: OTHER MISCELLANEOUS

## 2024-05-09 VITALS
WEIGHT: 167 LBS | SYSTOLIC BLOOD PRESSURE: 119 MMHG | HEART RATE: 82 BPM | RESPIRATION RATE: 18 BRPM | OXYGEN SATURATION: 98 % | TEMPERATURE: 98 F | HEIGHT: 72 IN | DIASTOLIC BLOOD PRESSURE: 74 MMHG | BODY MASS INDEX: 22.62 KG/M2

## 2024-05-09 DIAGNOSIS — S39.012A STRAIN OF LUMBAR REGION, INITIAL ENCOUNTER: ICD-10-CM

## 2024-05-09 DIAGNOSIS — Z02.6 ENCOUNTER RELATED TO WORKER'S COMPENSATION CLAIM: Primary | ICD-10-CM

## 2024-05-09 PROCEDURE — 99203 OFFICE O/P NEW LOW 30 MIN: CPT | Mod: S$GLB,,, | Performed by: FAMILY MEDICINE

## 2024-05-09 NOTE — PROGRESS NOTES
Subjective:      Patient ID: Erick Doyle II is a 23 y.o. male.    Chief Complaint: Back Pain    Patient's place of employment - [employer] on [employer].    Patient's job title - lead      Date of injury - 5/9/2024 at 1:30 pm    Body part injured including left or right - lower back injury     Injury Mechanism - lifting a 24 pack case of soda water. Pt mentions having a light trouble in balancing after event. When injury occurred, pt mentions spasm to left leg.    What they were doing when they got hurt - Pt was lifting a 24 pack case of soda water. As pt was lifting case, the bottles shifted and pt felt pain in his back and put down 24 pack case    What they did immediately after - Pt went to wall and stood straight against wall to relieve pain. Pt took 400 mg of Advil 15 min injury occurred with no relief    Pain scale right now - 3    Reviewed previous note, pt is a  at [employer]. Today lifting a 24 pack of soda water, he says he was lifting and then as he was lifting it started to shift and he felt an acute pain in his lower back. He says initially he says he stood against a wall, felt dizzy and says left leg felt like a spasm. He says this severe pain and sensation has resolved. He denies any weakness in legs, numbness or tingling, denies any foot drop. Denies any hx of back problems. He took ibu about 15 min after it happened, no relief. Pt reports he informed manager, and called off work tonight. He would like to rtw tomorrow night. Reports ambulatory after event.     Back Pain  This is a new problem. The current episode started today. The problem occurs constantly. The problem has been waxing and waning since onset. The pain is present in the lumbar spine. The quality of the pain is described as aching. The pain does not radiate. The pain is at a severity of 3/10. The pain is mild. The symptoms are aggravated by bending, twisting and position. Associated symptoms include leg pain (after  event, resolved now). Pertinent negatives include no abdominal pain, bladder incontinence, bowel incontinence, chest pain, dysuria, fever, headaches, numbness, paresis, paresthesias, pelvic pain, perianal numbness, tingling, weakness or weight loss. He has tried NSAIDs for the symptoms. The treatment provided no relief.       Constitution: Negative for activity change, appetite change, chills, sweating, fatigue and fever.   Cardiovascular:  Negative for chest pain.   Gastrointestinal:  Negative for abdominal pain and bowel incontinence.   Genitourinary:  Negative for dysuria, bladder incontinence and pelvic pain.   Musculoskeletal:  Positive for pain, back pain, pain with walking, muscle cramps and muscle ache. Negative for trauma, joint pain, joint swelling, abnormal ROM of joint, arthritis, gout and history of spine disorder.   Neurological:  Negative for headaches and numbness.     Objective:     Physical Exam  Constitutional:       General: He is not in acute distress.     Appearance: He is not ill-appearing, toxic-appearing or diaphoretic.   HENT:      Head: Normocephalic and atraumatic.   Musculoskeletal:      Lumbar back: No swelling, edema, deformity, signs of trauma, lacerations, spasms, tenderness or bony tenderness. Normal range of motion. Negative right straight leg raise test and negative left straight leg raise test. No scoliosis.      Comments: Paraspinal lumbar tenderness, pain reproduced with flexion and extension pain reproduced with rotation greater on the right.   Neurological:      Sensory: Sensation is intact.      Motor: Motor function is intact.      Coordination: Coordination is intact.      Gait: Gait is intact.        Assessment:      1. Encounter related to worker's compensation claim    2. Strain of lumbar region, initial encounter      Plan:     Rtc tomorrow to [facility] with [facility] on rtw instructions  Offered to get lumbar x-ray today he declined.  Advised on Tylenol, ibuprofen,  lidocaine patch over-the-counter for pain.  Follow-up tomorrow with occupational health.  Advised on gentle stretching, can use a heat pack.  Ref occ health    Discussed results/diagnosis/plan with patient in clinic. Strict precautions given to patient to monitor for worsening signs and symptoms. Advised to follow up with PCP or specialist.    Explained side effects of medications prescribed with patient and informed him/her to discontinue use if he/she has any side effects and to inform UC or PCP if this occurs. All questions answered. Strict ED verses clinic return precautions stressed and given in depth. Advised if symptoms worsens of fail to improve he/she should go to the Emergency Room. Discharge and follow-up instructions given verbally/printed with the patient who expressed understanding and willingness to comply with my recommendations. Patient voiced understanding and in agreement with current treatment plan. Patient exits the exam room in no acute distress. Conversant and engaged during discharge discussion, verbalized understanding.                Follow up in about 1 day (around 5/10/2024).

## 2024-05-10 NOTE — PATIENT INSTRUCTIONS
General Discharge Instructions   PLEASE READ YOUR DISCHARGE INSTRUCTIONS ENTIRELY AS IT CONTAINS IMPORTANT INFORMATION.  If you were prescribed a narcotic or controlled medication, do not drive or operate heavy equipment or machinery while taking these medications.  If you were prescribed antibiotics, please take them to completion.  You must understand that you've received an Urgent Care treatment only and that you may be released before all your medical problems are known or treated. You, the patient, will arrange for follow up care as instructed.    OVER THE COUNTER RECOMMENDATIONS/SUGGESTIONS.    Make sure to stay well hydrated.    Use Nasal Saline to mechanically move any post nasal drip from your eustachian tube or from the back of your throat.    Use warm salt water gargles to ease your throat pain. Warm salt water gargles as needed for sore throat- 1/2 tsp salt to 1 cup warm water, gargle as desired.    Use an antihistamine such as Claritin, Zyrtec or Allegra to dry you out.    Use pseudoephedrine (behind the counter) to decongest. Pseudoephedrine 30 mg up to 240 mg /day. It can raise your blood pressure and give you palpitations.    Use mucinex (guaifenesin) to break up mucous up to 2400mg/day to loosen any mucous.    The mucinex DM pill has a cough suppressant that can be sedating. It can be used at night to stop the tickle at the back of your throat.    You can use Mucinex D (it has guaifenesin and a high dose of pseudoephedrine) in the mornings to help decongest.    Use Afrin in each nare for no longer than 3 days, as it is addictive. It can also dry out your mucous membranes and cause elevated blood pressure. This is especially useful if you are flying.    Use Flonase 1-2 sprays/nostril per day. It is a local acting steroid nasal spray, if you develop a bloody nose, stop using the medication immediately.    Sometimes Nyquil at night is beneficial to help you get some rest, however it is sedating and it  does have an antihistamine, and tylenol.    Honey is a natural cough suppressant that can be used.    Tylenol up to 4,000 mg a day is safe for short periods and can be used for body aches, pain, and fever. However in high doses and prolonged use it can cause liver irritation.    Ibuprofen is a non-steroidal anti-inflammatory that can be used for body aches, pain, and fever.However it can also cause stomach irritation if over used.     Follow up with your PCP or specialty clinic as instructed in the next 2-3 days if not improved or as needed. You can call [phone] to schedule an appointment with appropriate provider.      If you condition worsens, we recommend that you receive another evaluation at the emergency room immediately or contact your primary medical clinic's after hours call service to discuss your concerns.      Please return here or go to the Emergency Department for any concerns or worsening condition.   You can also call [phone] to schedule an appointment with the appropriate provider.    Please return here or go to the Emergency Department for any concerns or worsening of condition.    Thank you for choosing [facility]!    Our goal in the Urgent Care is to always provide outstanding medical care. You may receive a survey by mail or e-mail in the next week regarding your experience today. We would greatly appreciate you completing and returning the survey. Your feedback provides us with a way to recognize our staff who provide very good care, and it helps us learn how to improve when your experience was below our aspiration of excellence.      We appreciate you trusting us with your medical care. We hope you feel better soon. We will be happy to take care of you for all of your future medical needs.    Sincerely,    LUIS F Lizama

## 2024-05-14 ENCOUNTER — TELEPHONE (OUTPATIENT)
Dept: URGENT CARE | Facility: CLINIC | Age: 24
End: 2024-05-14
Payer: OTHER MISCELLANEOUS

## 2024-05-14 NOTE — TELEPHONE ENCOUNTER
Called the patient in reference to the referral placed to Occupational Health and received the patients voicemail box, I left a message informing the patient of the reason for the call. AFG

## 2024-05-23 ENCOUNTER — CLINICAL SUPPORT (OUTPATIENT)
Dept: OTHER | Facility: CLINIC | Age: 24
End: 2024-05-23
Payer: COMMERCIAL

## 2024-05-23 DIAGNOSIS — Z00.8 ENCOUNTER FOR OTHER GENERAL EXAMINATION: ICD-10-CM

## 2024-05-23 PROCEDURE — 99401 PREV MED CNSL INDIV APPRX 15: CPT | Mod: S$GLB,,, | Performed by: INTERNAL MEDICINE

## 2024-05-23 PROCEDURE — 82947 ASSAY GLUCOSE BLOOD QUANT: CPT | Mod: QW,S$GLB,, | Performed by: INTERNAL MEDICINE

## 2024-05-23 PROCEDURE — 80061 LIPID PANEL: CPT | Mod: QW,S$GLB,, | Performed by: INTERNAL MEDICINE

## 2024-05-24 VITALS
DIASTOLIC BLOOD PRESSURE: 74 MMHG | WEIGHT: 165 LBS | HEIGHT: 71 IN | BODY MASS INDEX: 23.1 KG/M2 | SYSTOLIC BLOOD PRESSURE: 115 MMHG

## 2024-05-24 LAB
GLUCOSE SERPL-MCNC: 108 MG/DL (ref 60–140)
HDLC SERPL-MCNC: 58 MG/DL
POC CHOLESTEROL, TOTAL: 123 MG/DL
TRIGL SERPL-MCNC: 44 MG/DL

## 2024-06-03 ENCOUNTER — OFFICE VISIT (OUTPATIENT)
Dept: INTERNAL MEDICINE | Facility: CLINIC | Age: 24
End: 2024-06-03
Payer: COMMERCIAL

## 2024-06-03 VITALS
BODY MASS INDEX: 24.35 KG/M2 | HEIGHT: 71 IN | SYSTOLIC BLOOD PRESSURE: 120 MMHG | HEART RATE: 74 BPM | OXYGEN SATURATION: 98 % | WEIGHT: 173.94 LBS | DIASTOLIC BLOOD PRESSURE: 62 MMHG

## 2024-06-03 DIAGNOSIS — Z23 NEED FOR VACCINATION: ICD-10-CM

## 2024-06-03 DIAGNOSIS — Z76.89 ENCOUNTER TO ESTABLISH CARE WITH NEW DOCTOR: ICD-10-CM

## 2024-06-03 DIAGNOSIS — Z00.00 ENCOUNTER FOR ANNUAL GENERAL MEDICAL EXAMINATION WITHOUT ABNORMAL FINDINGS IN ADULT: Primary | ICD-10-CM

## 2024-06-03 PROCEDURE — 3074F SYST BP LT 130 MM HG: CPT | Mod: CPTII,S$GLB,, | Performed by: FAMILY MEDICINE

## 2024-06-03 PROCEDURE — 3078F DIAST BP <80 MM HG: CPT | Mod: CPTII,S$GLB,, | Performed by: FAMILY MEDICINE

## 2024-06-03 PROCEDURE — 90471 IMMUNIZATION ADMIN: CPT | Mod: S$GLB,,, | Performed by: FAMILY MEDICINE

## 2024-06-03 PROCEDURE — 99999 PR PBB SHADOW E&M-EST. PATIENT-LVL III: CPT | Mod: PBBFAC,,, | Performed by: FAMILY MEDICINE

## 2024-06-03 PROCEDURE — 1159F MED LIST DOCD IN RCRD: CPT | Mod: CPTII,S$GLB,, | Performed by: FAMILY MEDICINE

## 2024-06-03 PROCEDURE — 99395 PREV VISIT EST AGE 18-39: CPT | Mod: 25,S$GLB,, | Performed by: FAMILY MEDICINE

## 2024-06-03 PROCEDURE — 3008F BODY MASS INDEX DOCD: CPT | Mod: CPTII,S$GLB,, | Performed by: FAMILY MEDICINE

## 2024-06-03 PROCEDURE — 1160F RVW MEDS BY RX/DR IN RCRD: CPT | Mod: CPTII,S$GLB,, | Performed by: FAMILY MEDICINE

## 2024-06-03 PROCEDURE — 90715 TDAP VACCINE 7 YRS/> IM: CPT | Mod: S$GLB,,, | Performed by: FAMILY MEDICINE

## 2024-06-03 NOTE — PROGRESS NOTES
Subjective:     Patient ID: Erick Doyle II is a 23 y.o. male.   Chief Complaint: Annual Exam and Establish Care    HPI:  Patient presents to establish care.  Patient is due for annual exam and labs.    No acute concerns.  No refills needed.  No active medications.        Review of Problems & History:  There is no problem list on file for this patient.     History reviewed. No pertinent past medical history.   History reviewed. No pertinent surgical history.   Social History     Socioeconomic History    Marital status: Single   Tobacco Use    Smoking status: Some Days     Types: Cigarettes, Vaping with nicotine    Smokeless tobacco: Never   Substance and Sexual Activity    Alcohol use: Yes     Alcohol/week: 13.0 standard drinks of alcohol     Types: 6 Glasses of wine, 3 Cans of beer, 4 Shots of liquor per week    Drug use: Yes     Frequency: 3.0 times per week     Types: Marijuana    Sexual activity: Yes     Partners: Female     Birth control/protection: None   Social History Narrative    ** Merged History Encounter **          Social Determinants of Health     Financial Resource Strain: Low Risk  (6/3/2024)    Overall Financial Resource Strain (CARDIA)     Difficulty of Paying Living Expenses: Not very hard   Food Insecurity: No Food Insecurity (6/3/2024)    Hunger Vital Sign     Worried About Running Out of Food in the Last Year: Never true     Ran Out of Food in the Last Year: Never true   Physical Activity: Sufficiently Active (6/3/2024)    Exercise Vital Sign     Days of Exercise per Week: 6 days     Minutes of Exercise per Session: 40 min   Stress: Stress Concern Present (6/3/2024)    [employer] - Occupational Stress Questionnaire     Feeling of Stress : To some extent   Housing Stability: Unknown (6/3/2024)    Housing Stability Vital Sign     Unable to Pay for Housing in the Last Year: No        Medication Review:  No current outpatient medications on file.     Review of  "Systems   Constitutional:  Negative for chills, fatigue and fever.   HENT:  Negative for nasal congestion, ear pain, postnasal drip and sore throat.    Eyes:  Negative for visual disturbance.   Respiratory:  Negative for cough, shortness of breath and wheezing.    Cardiovascular:  Negative for chest pain and palpitations.   Gastrointestinal:  Negative for abdominal pain, change in bowel habit, constipation, diarrhea, nausea and vomiting.   Genitourinary:  Negative for dysuria and hematuria.   Musculoskeletal:  Negative for back pain, leg pain and neck pain.   Neurological:  Negative for dizziness, numbness and headaches.   Hematological:  Negative for adenopathy.   Psychiatric/Behavioral:  Negative for dysphoric mood, sleep disturbance and suicidal ideas. The patient is not nervous/anxious.           Objective:      Vitals:    06/03/24 1349   BP: 120/62   BP Location: Right arm   Patient Position: Sitting   BP Method: Medium (Manual)   Pulse: 74   SpO2: 98%   Weight: 78.9 kg (173 lb 15.1 oz)   Height: 5' 11" (1.803 m)      Physical Exam  Vitals reviewed.   Constitutional:       General: He is not in acute distress.     Appearance: Normal appearance. He is normal weight. He is not ill-appearing.   HENT:      Head: Normocephalic and atraumatic.      Right Ear: Tympanic membrane, ear canal and external ear normal. There is no impacted cerumen.      Left Ear: Tympanic membrane, ear canal and external ear normal. There is no impacted cerumen.      Nose: Nose normal. No congestion or rhinorrhea.      Mouth/Throat:      Mouth: Mucous membranes are moist.      Pharynx: Oropharynx is clear. No oropharyngeal exudate or posterior oropharyngeal erythema.   Eyes:      General: No scleral icterus.        Right eye: No discharge.         Left eye: No discharge.      Conjunctiva/sclera: Conjunctivae normal.   Neck:      Thyroid: No thyroid mass, thyromegaly or thyroid tenderness.   Cardiovascular:      Rate and Rhythm: Normal rate " and regular rhythm.      Pulses: Normal pulses.      Heart sounds: Normal heart sounds. No murmur heard.     No friction rub. No gallop.   Pulmonary:      Effort: Pulmonary effort is normal. No respiratory distress.      Breath sounds: Normal breath sounds. No wheezing, rhonchi or rales.   Abdominal:      General: Abdomen is flat. Bowel sounds are normal. There is no distension.      Palpations: Abdomen is soft. There is no mass.      Tenderness: There is no abdominal tenderness. There is no guarding.   Musculoskeletal:         General: No swelling or deformity. Normal range of motion.      Cervical back: Normal range of motion and neck supple. No tenderness.   Lymphadenopathy:      Cervical: No cervical adenopathy.   Skin:     General: Skin is warm and dry.   Neurological:      General: No focal deficit present.      Mental Status: He is alert and oriented to person, place, and time. Mental status is at baseline.      Gait: Gait normal.      Deep Tendon Reflexes: Reflexes normal.   Psychiatric:         Mood and Affect: Mood normal.         Behavior: Behavior normal.         Thought Content: Thought content normal.         Judgment: Judgment normal.           Assessment:       Problem List Items Addressed This Visit    None  Visit Diagnoses       Encounter for annual general medical examination without abnormal findings in adult    -  Primary    Relevant Orders    Comprehensive Metabolic Panel    CBC Auto Differential    Lipid Panel    Hemoglobin A1C    TSH    T4, Free    Hepatitis C Antibody    HIV 1/2 Ag/Ab (4th Gen)    Encounter to establish care with new doctor        Need for vaccination        Relevant Orders    Tdap Vaccine              Plan:       1. Encounter for annual general medical examination without abnormal findings in adult  Health maintenance updated  Chronic issues reviewed  Fasting labs ordered  - Comprehensive Metabolic Panel; Future  - CBC Auto Differential; Future  - Lipid Panel; Future  -  Hemoglobin A1C; Future  - TSH; Future  - T4, Free; Future  - Hepatitis C Antibody; Future  - HIV 1/2 Ag/Ab (4th Gen); Future    2. Encounter to establish care with new doctor  Reviewed chronic medical conditions, updated problem list and medication list    3. Need for vaccination  Tetanus administered in clinic  - Tdap Vaccine          Follow up in about 1 year (around 6/3/2025) for Annual Visit, Fasting labs.     Stephen Schwab MD, [facility]  Family Medicine Physician  [facility]  06/03/2024       This note was produced using voice recognition technology. Some typographical or syntax errors may be present, despite best efforts with proofreading.

## 2024-06-10 ENCOUNTER — PATIENT MESSAGE (OUTPATIENT)
Dept: INTERNAL MEDICINE | Facility: CLINIC | Age: 24
End: 2024-06-10
Payer: COMMERCIAL

## 2024-06-10 ENCOUNTER — LAB VISIT (OUTPATIENT)
Dept: LAB | Facility: HOSPITAL | Age: 24
End: 2024-06-10
Attending: FAMILY MEDICINE
Payer: COMMERCIAL

## 2024-06-10 DIAGNOSIS — Z00.00 ENCOUNTER FOR ANNUAL GENERAL MEDICAL EXAMINATION WITHOUT ABNORMAL FINDINGS IN ADULT: ICD-10-CM

## 2024-06-10 LAB
ALBUMIN SERPL BCP-MCNC: 4.3 G/DL (ref 3.5–5.2)
ALP SERPL-CCNC: 58 U/L (ref 55–135)
ALT SERPL W/O P-5'-P-CCNC: 17 U/L (ref 10–44)
ANION GAP SERPL CALC-SCNC: 11 MMOL/L (ref 8–16)
AST SERPL-CCNC: 15 U/L (ref 10–40)
BASOPHILS # BLD AUTO: 0.11 K/UL (ref 0–0.2)
BASOPHILS NFR BLD: 1.6 % (ref 0–1.9)
BILIRUB SERPL-MCNC: 0.2 MG/DL (ref 0.1–1)
BUN SERPL-MCNC: 12 MG/DL (ref 6–20)
CALCIUM SERPL-MCNC: 9.5 MG/DL (ref 8.7–10.5)
CHLORIDE SERPL-SCNC: 105 MMOL/L (ref 95–110)
CHOLEST SERPL-MCNC: 140 MG/DL (ref 120–199)
CHOLEST/HDLC SERPL: 2.6 {RATIO} (ref 2–5)
CO2 SERPL-SCNC: 25 MMOL/L (ref 23–29)
CREAT SERPL-MCNC: 0.8 MG/DL (ref 0.5–1.4)
DIFFERENTIAL METHOD BLD: ABNORMAL
EOSINOPHIL # BLD AUTO: 0.4 K/UL (ref 0–0.5)
EOSINOPHIL NFR BLD: 5.9 % (ref 0–8)
ERYTHROCYTE [DISTWIDTH] IN BLOOD BY AUTOMATED COUNT: 12.5 % (ref 11.5–14.5)
EST. GFR  (NO RACE VARIABLE): >60 ML/MIN/1.73 M^2
ESTIMATED AVG GLUCOSE: 105 MG/DL (ref 68–131)
GLUCOSE SERPL-MCNC: 90 MG/DL (ref 70–110)
HBA1C MFR BLD: 5.3 % (ref 4–5.6)
HCT VFR BLD AUTO: 43.1 % (ref 40–54)
HCV AB SERPL QL IA: NORMAL
HDLC SERPL-MCNC: 53 MG/DL (ref 40–75)
HDLC SERPL: 37.9 % (ref 20–50)
HGB BLD-MCNC: 13.9 G/DL (ref 14–18)
HIV 1+2 AB+HIV1 P24 AG SERPL QL IA: NORMAL
IMM GRANULOCYTES # BLD AUTO: 0.02 K/UL (ref 0–0.04)
IMM GRANULOCYTES NFR BLD AUTO: 0.3 % (ref 0–0.5)
LDLC SERPL CALC-MCNC: 69.8 MG/DL (ref 63–159)
LYMPHOCYTES # BLD AUTO: 2.1 K/UL (ref 1–4.8)
LYMPHOCYTES NFR BLD: 29.6 % (ref 18–48)
MCH RBC QN AUTO: 29.3 PG (ref 27–31)
MCHC RBC AUTO-ENTMCNC: 32.3 G/DL (ref 32–36)
MCV RBC AUTO: 91 FL (ref 82–98)
MONOCYTES # BLD AUTO: 0.5 K/UL (ref 0.3–1)
MONOCYTES NFR BLD: 7.6 % (ref 4–15)
NEUTROPHILS # BLD AUTO: 3.8 K/UL (ref 1.8–7.7)
NEUTROPHILS NFR BLD: 55 % (ref 38–73)
NONHDLC SERPL-MCNC: 87 MG/DL
NRBC BLD-RTO: 0 /100 WBC
PLATELET # BLD AUTO: 269 K/UL (ref 150–450)
PMV BLD AUTO: 11.6 FL (ref 9.2–12.9)
POTASSIUM SERPL-SCNC: 3.9 MMOL/L (ref 3.5–5.1)
PROT SERPL-MCNC: 7.2 G/DL (ref 6–8.4)
RBC # BLD AUTO: 4.75 M/UL (ref 4.6–6.2)
SODIUM SERPL-SCNC: 141 MMOL/L (ref 136–145)
T4 FREE SERPL-MCNC: 0.95 NG/DL (ref 0.71–1.51)
TRIGL SERPL-MCNC: 86 MG/DL (ref 30–150)
TSH SERPL DL<=0.005 MIU/L-ACNC: 1.23 UIU/ML (ref 0.4–4)
WBC # BLD AUTO: 6.95 K/UL (ref 3.9–12.7)

## 2024-06-10 PROCEDURE — 80061 LIPID PANEL: CPT | Performed by: FAMILY MEDICINE

## 2024-06-10 PROCEDURE — 84439 ASSAY OF FREE THYROXINE: CPT | Performed by: FAMILY MEDICINE

## 2024-06-10 PROCEDURE — 86803 HEPATITIS C AB TEST: CPT | Performed by: FAMILY MEDICINE

## 2024-06-10 PROCEDURE — 85025 COMPLETE CBC W/AUTO DIFF WBC: CPT | Performed by: FAMILY MEDICINE

## 2024-06-10 PROCEDURE — 87389 HIV-1 AG W/HIV-1&-2 AB AG IA: CPT | Performed by: FAMILY MEDICINE

## 2024-06-10 PROCEDURE — 83036 HEMOGLOBIN GLYCOSYLATED A1C: CPT | Performed by: FAMILY MEDICINE

## 2024-06-10 PROCEDURE — 36415 COLL VENOUS BLD VENIPUNCTURE: CPT | Performed by: FAMILY MEDICINE

## 2024-06-10 PROCEDURE — 80053 COMPREHEN METABOLIC PANEL: CPT | Performed by: FAMILY MEDICINE

## 2024-06-10 PROCEDURE — 84443 ASSAY THYROID STIM HORMONE: CPT | Performed by: FAMILY MEDICINE
